# Patient Record
Sex: FEMALE
[De-identification: names, ages, dates, MRNs, and addresses within clinical notes are randomized per-mention and may not be internally consistent; named-entity substitution may affect disease eponyms.]

---

## 2024-06-28 ENCOUNTER — DASHBOARD ENCOUNTERS (OUTPATIENT)
Age: 44
End: 2024-06-28

## 2024-06-28 ENCOUNTER — OFFICE VISIT (OUTPATIENT)
Dept: URBAN - METROPOLITAN AREA CLINIC 12 | Facility: CLINIC | Age: 44
End: 2024-06-28
Payer: COMMERCIAL

## 2024-06-28 VITALS
SYSTOLIC BLOOD PRESSURE: 121 MMHG | BODY MASS INDEX: 34.66 KG/M2 | WEIGHT: 195.6 LBS | HEART RATE: 85 BPM | HEIGHT: 63 IN | DIASTOLIC BLOOD PRESSURE: 84 MMHG | TEMPERATURE: 98 F

## 2024-06-28 DIAGNOSIS — K57.90 DIVERTICULOSIS: ICD-10-CM

## 2024-06-28 DIAGNOSIS — K62.5 RECTAL BLEEDING: ICD-10-CM

## 2024-06-28 PROBLEM — 397881000: Status: ACTIVE | Noted: 2024-06-28

## 2024-06-28 PROCEDURE — 99203 OFFICE O/P NEW LOW 30 MIN: CPT

## 2024-06-28 NOTE — HPI-TODAY'S VISIT:
This patient was referred by Dr. Liset Hays for an evaluation of rectal bleeding.  A copy of this will be sent to the referring provider.  Pt is a 44 yo female w/ hx of diverticulitis presents for intermittent rectal bleeding for 1 year. She reports seeing bright red blood with wiping and sometimes in stool. It happens intermittently, about once a month. Also reports occasioanl rectal pain with bowel movements. Denies abd pain, constipation, diarrhea, changes in weight or appetite. Has soft formed stool daily. S/p 6 vaginal deliveries. Denies problem with heart, kidneys or lungs. Denies FH of CRC.

## 2024-07-15 ENCOUNTER — CLAIMS CREATED FROM THE CLAIM WINDOW (OUTPATIENT)
Dept: URBAN - METROPOLITAN AREA SURGERY CENTER 15 | Facility: SURGERY CENTER | Age: 44
End: 2024-07-15
Payer: COMMERCIAL

## 2024-07-15 DIAGNOSIS — K62.5 ANAL BLEEDING: ICD-10-CM

## 2024-07-15 DIAGNOSIS — K57.30 DIVERTICULOSIS OF LARGE INTESTINE WITHOUT PERFORATION OR ABSCESS WITHOUT BLEEDING: ICD-10-CM

## 2024-07-15 DIAGNOSIS — K64.4 EXTERNAL HEMORRHOIDS: ICD-10-CM

## 2024-07-15 DIAGNOSIS — K62.5 RECTAL BLEEDING: ICD-10-CM

## 2024-07-15 PROCEDURE — 00811 ANES LWR INTST NDSC NOS: CPT | Performed by: NURSE ANESTHETIST, CERTIFIED REGISTERED

## 2024-07-15 PROCEDURE — 45378 DIAGNOSTIC COLONOSCOPY: CPT | Performed by: INTERNAL MEDICINE
